# Patient Record
Sex: MALE | Race: WHITE | NOT HISPANIC OR LATINO | Employment: FULL TIME | URBAN - METROPOLITAN AREA
[De-identification: names, ages, dates, MRNs, and addresses within clinical notes are randomized per-mention and may not be internally consistent; named-entity substitution may affect disease eponyms.]

---

## 2025-06-26 ENCOUNTER — HOSPITAL ENCOUNTER (EMERGENCY)
Facility: HOSPITAL | Age: 50
Discharge: HOME | End: 2025-06-26
Payer: COMMERCIAL

## 2025-06-26 VITALS
BODY MASS INDEX: 20.31 KG/M2 | WEIGHT: 149.91 LBS | HEIGHT: 72 IN | DIASTOLIC BLOOD PRESSURE: 94 MMHG | TEMPERATURE: 97.9 F | OXYGEN SATURATION: 98 % | RESPIRATION RATE: 16 BRPM | HEART RATE: 83 BPM | SYSTOLIC BLOOD PRESSURE: 157 MMHG

## 2025-06-26 DIAGNOSIS — S51.819A: Primary | ICD-10-CM

## 2025-06-26 PROCEDURE — 90471 IMMUNIZATION ADMIN: CPT | Performed by: NURSE PRACTITIONER

## 2025-06-26 PROCEDURE — 96372 THER/PROPH/DIAG INJ SC/IM: CPT | Performed by: NURSE PRACTITIONER

## 2025-06-26 PROCEDURE — 99283 EMERGENCY DEPT VISIT LOW MDM: CPT | Mod: 25

## 2025-06-26 PROCEDURE — 12032 INTMD RPR S/A/T/EXT 2.6-7.5: CPT | Performed by: NURSE PRACTITIONER

## 2025-06-26 PROCEDURE — 29075 APPL CST ELBW FNGR SHORT ARM: CPT | Performed by: NURSE PRACTITIONER

## 2025-06-26 PROCEDURE — 90715 TDAP VACCINE 7 YRS/> IM: CPT | Performed by: NURSE PRACTITIONER

## 2025-06-26 PROCEDURE — 2500000004 HC RX 250 GENERAL PHARMACY W/ HCPCS (ALT 636 FOR OP/ED): Performed by: NURSE PRACTITIONER

## 2025-06-26 RX ORDER — LIDOCAINE HYDROCHLORIDE AND EPINEPHRINE 10; 10 UG/ML; MG/ML
2 INJECTION, SOLUTION INFILTRATION; PERINEURAL ONCE
Status: COMPLETED | OUTPATIENT
Start: 2025-06-26 | End: 2025-06-26

## 2025-06-26 RX ORDER — CEPHALEXIN 500 MG/1
500 CAPSULE ORAL 4 TIMES DAILY
Qty: 28 CAPSULE | Refills: 0 | Status: SHIPPED | OUTPATIENT
Start: 2025-06-26 | End: 2025-07-03

## 2025-06-26 RX ORDER — CEFAZOLIN 1 G/1
1 INJECTION, POWDER, FOR SOLUTION INTRAVENOUS ONCE
Status: COMPLETED | OUTPATIENT
Start: 2025-06-26 | End: 2025-06-26

## 2025-06-26 RX ADMIN — TETANUS TOXOID, REDUCED DIPHTHERIA TOXOID AND ACELLULAR PERTUSSIS VACCINE, ADSORBED 0.5 ML: 5; 2.5; 8; 8; 2.5 SUSPENSION INTRAMUSCULAR at 19:41

## 2025-06-26 RX ADMIN — CEFAZOLIN 1 G: 1 INJECTION, POWDER, FOR SOLUTION INTRAMUSCULAR; INTRAVENOUS at 19:41

## 2025-06-26 RX ADMIN — LIDOCAINE HYDROCHLORIDE,EPINEPHRINE BITARTRATE 2 ML: 10; .01 INJECTION, SOLUTION INFILTRATION; PERINEURAL at 19:45

## 2025-06-26 NOTE — ED TRIAGE NOTES
Pt states that he has a cut on his right upper forearm that he got while working. Unsure of when his last tetanus shot was.

## 2025-06-26 NOTE — ED PROVIDER NOTES
Emergency Department Provider Note        History of Present Illness     History provided by: Patient  Limitations to History: None    HPI:  Saji Mi is a 49 y.o. male with no significant past medical history reported presents emergency department today due to a laceration to his right, dominant hand, forearm.  Patient states that he was moving some sheet-metal while he was at work and a piece of the sheet-metal pressed into his arm causing a laceration.  Patient was having pain with finger movements and shifting his car after work.  He was advised by his employer to come in for evaluation.  He denies any distal numbness or weakness.  Bleeding is controlled.  He does not believe his tetanus shot is updated.    Physical Exam   Triage vitals:  T 36.6 °C (97.9 °F)  HR 83  BP (!) 157/94  RR 16  O2 98 %      Physical Exam  Vitals and nursing note reviewed.   Constitutional:       General: He is not in acute distress.     Appearance: He is well-developed.   HENT:      Head: Normocephalic and atraumatic.   Eyes:      Conjunctiva/sclera: Conjunctivae normal.   Cardiovascular:      Rate and Rhythm: Normal rate and regular rhythm.      Heart sounds: No murmur heard.  Pulmonary:      Effort: Pulmonary effort is normal. No respiratory distress.      Breath sounds: Normal breath sounds.   Abdominal:      Palpations: Abdomen is soft.      Tenderness: There is no abdominal tenderness.   Musculoskeletal:         General: No swelling.        Arms:       Cervical back: Neck supple.      Comments: LACERATION:     HAND EXAM: Exam of the right upper extremity shows a 3 centimeter laceration over the volar aspect of his forearm. It appears to be fairly superficial. There are no foreign bodies. Is neurovascularly intact distally. Specifically, has full strength with flexion and extension. Was examined  through full range of motion with no obvious tendon injury.  Patient does have pain in the forearm with flexion of digits 3  through 5.   Skin:     General: Skin is warm and dry.      Capillary Refill: Capillary refill takes less than 2 seconds.   Neurological:      Mental Status: He is alert.   Psychiatric:         Mood and Affect: Mood normal.          Medical Decision Making & ED Course   Medical Decision Makin y.o. male with no significant past medical history reported presents emergency department today due to a laceration to his right, dominant hand, forearm.  Patient states that he was moving some sheet-metal while he was at work and a piece of the sheet-metal pressed into his arm causing a laceration.  Patient was having pain with finger movements and shifting his car after work.  He was advised by his employer to come in for evaluation.  He denies any distal numbness or weakness.  Bleeding is controlled.  He does not believe his tetanus shot is updated.  Patient's examination reveals a laceration to his forearm.  It appears fairly superficial however I cannot visualize the base due to clot formation.  I did copious amounts of pressure wash and still unable to visualize past the soft tissue.  Given his pain with movement I am concerned there may be an occult tendon injury.  I did cover him with Ancef and splinted his arm after repaired his laceration.  There is no evidence of tendon injury, nerve injury or significant vascular injury. Laceration was repaired, see procedure note. wound infection precautions were discussed with patient and patient verbalized understanding these.  Timeframe to return to ED for suture removal was discussed with patient if indicated. plint was placed by nurse Jaskaran LPN, see procedure note.  RICE Therapy discussed with patient/parent.  Post splint care discussed.  Patient neurovascular status maintained after splint application.  If numbness, tingling, coolness, severe pain in the extremity, or delayed capillary refill occur, return to ED immediately for evaluation.  Given that this injury occurred  at work I did place referral to occupational health for follow-up.  Patient also referred to hand surgery for possible tendon injury.  ----  Differential diagnoses considered include but are not limited to: Finger laceration, tendon injury, nerve injury, vascular injury        Social Determinants of Health which Significantly Impact Care: None identified     EKG Independent Interpretation: EKG not obtained    Independent Result Review and Interpretation: None obtained    Chronic conditions affecting the patient's care: As documented above in [facility]    The patient was discussed with the following consultants/services: None    Care Considerations: As documented above in [facility]    ED Course:  Diagnoses as of 06/26/25 1933   Forearm laceration with complication, initial encounter     Disposition   As a result of the work-up, the patient was discharged home.  he was informed of his diagnosis and instructed to come back with any concerns or worsening of condition.  he and was agreeable to the plan as discussed above.  he was given the opportunity to ask questions.  All of the patient's questions were answered.    Procedures   Laceration Repair    Performed by: ABHISHEK Mackenzie  Authorized by: ABHISHEK Mackenzie    Consent:     Consent obtained:  Verbal    Consent given by:  Patient    Risks, benefits, and alternatives were discussed: yes      Risks discussed:  Infection, pain, retained foreign body, need for additional repair, poor cosmetic result, tendon damage, nerve damage, poor wound healing and vascular damage    Alternatives discussed:  No treatment, delayed treatment, observation and referral  Universal protocol:     Procedure explained and questions answered to patient or proxy's satisfaction: yes      Relevant documents present and verified: yes      Required blood products, implants, devices, and special equipment available: yes      Immediately prior to procedure, a time out was called: yes       Patient identity confirmed:  Verbally with patient and arm band  Anesthesia:     Anesthesia method:  Local infiltration    Local anesthetic:  Lidocaine 1% WITH epi  Laceration details:     Location:  Shoulder/arm    Shoulder/arm location:  R lower arm    Length (cm):  3  Pre-procedure details:     Preparation:  Patient was prepped and draped in usual sterile fashion  Exploration:     Hemostasis achieved with:  Direct pressure and epinephrine    Wound exploration: wound explored through full range of motion    Treatment:     Area cleansed with:  Saline    Amount of cleaning:  Extensive    Irrigation volume:  500    Irrigation method:  Pressure wash  Skin repair:     Repair method:  Sutures    Suture size:  4-0    Suture material:  Prolene    Suture technique:  Simple interrupted    Number of sutures:  3  Approximation:     Approximation:  Close  Repair type:     Repair type:  Intermediate  Post-procedure details:     Dressing:  Adhesive bandage    Procedure completion:  Tolerated well, no immediate complications  Splint Application    Performed by: ABHISHEK Mackenzie  Authorized by: ABHISHEK Mackenzie    Consent:     Consent obtained:  Verbal    Consent given by:  Patient    Risks, benefits, and alternatives were discussed: yes      Risks discussed:  Discoloration, numbness, pain and swelling    Alternatives discussed:  No treatment, delayed treatment, alternative treatment, observation and referral  Universal protocol:     Procedure explained and questions answered to patient or proxy's satisfaction: yes      Relevant documents present and verified: yes      Test results available: yes      Imaging studies available: yes      Required blood products, implants, devices, and special equipment available: yes      Site/side marked: yes      Immediately prior to procedure a time out was called: yes      Patient identity confirmed:  Verbally with patient and arm band  Pre-procedure details:     Distal  neurologic exam:  Normal    Distal perfusion: distal pulses strong and brisk capillary refill    Procedure details:     Location:  Hand    Hand location:  R hand    Cast type:  Short arm    Splint type:  Volar short arm    Supplies:  Fiberglass    Splint applied by::  TEMITOPE Lou    Supervision: I personally supervised and inspected the splint/strap which was applied. The extremity is appropriately immobilized. Patient neurovascularly intact before and after the splint application.    Post-procedure details:     Distal neurologic exam:  Normal    Distal perfusion: distal pulses strong and brisk capillary refill      Procedure completion:  Tolerated well, no immediate complications    Post-procedure imaging: not applicable        Patient was seen independently    ABHISHEK Mackenzie  Emergency Medicine     ABHISHEK Mackenzie  06/26/25 1940

## 2025-06-30 ENCOUNTER — OFFICE VISIT (OUTPATIENT)
Dept: ORTHOPEDIC SURGERY | Facility: CLINIC | Age: 50
End: 2025-06-30
Payer: COMMERCIAL

## 2025-06-30 DIAGNOSIS — S51.811A FOREARM LACERATION, RIGHT, INITIAL ENCOUNTER: Primary | ICD-10-CM

## 2025-06-30 PROCEDURE — 99203 OFFICE O/P NEW LOW 30 MIN: CPT | Performed by: ORTHOPAEDIC SURGERY

## 2025-06-30 PROCEDURE — 99212 OFFICE O/P EST SF 10 MIN: CPT | Performed by: ORTHOPAEDIC SURGERY

## 2025-06-30 NOTE — PROGRESS NOTES
Subjective    Patient ID: Saji Mi is a 49 y.o. male.    Chief Complaint: Worker's Compensation (RT FOREARM LACERATION/DOI: 6/26/25)     Last Surgery: No surgery found  Last Surgery Date: No surgery found    HPI  Patient is a 49-year-old right-hand-dominant male who sustained a laceration to his right forearm on June 26, 2025.  Intracardial work.  A large piece of sheet metal causing transverse laceration along the volar aspect of his right forearm and pretty much the mid shaft.  It was overlying the FCR tendon.  He was seen at [facility] where the wound was irrigated.  He does complain of mild paresthesias in the median nerve distribution.  However he does not notice any inability to hold objects.    Objective   Ortho Exam  The patient is in no acute distress.  Exam of his right forearm reveals he has about a 2 cm transverse laceration overlying the FCR tendon.  The FCR clinically appears intact with no palpable defect.  He has appropriate function of his FPL, left FDS and FDP of all 4 fingers.  He also has function of his FCR.  He has a negative Tinel's test near the laceration site and distally at the carpal tunnel.    Assessment/Plan   Encounter Diagnoses:  Forearm laceration, right, initial encounter    The patient sustained a laceration to his right forearm.  Clinically there is no evidence of any tendon injury.  He also appears to have no injury to his median nerve.  However the patient will return to clinic in 1 week for a wound check and removal of sutures.  At that time another nerve exam will be performed determine if there are any changes.

## 2025-07-09 ENCOUNTER — OFFICE VISIT (OUTPATIENT)
Dept: ORTHOPEDIC SURGERY | Facility: CLINIC | Age: 50
End: 2025-07-09
Payer: COMMERCIAL

## 2025-07-09 VITALS — WEIGHT: 149 LBS | HEIGHT: 72 IN | BODY MASS INDEX: 20.18 KG/M2

## 2025-07-09 DIAGNOSIS — S51.811A FOREARM LACERATION, RIGHT, INITIAL ENCOUNTER: Primary | ICD-10-CM

## 2025-07-09 PROCEDURE — 99212 OFFICE O/P EST SF 10 MIN: CPT | Performed by: ORTHOPAEDIC SURGERY

## 2025-07-09 NOTE — PROGRESS NOTES
Subjective    Patient ID: Saji Mi is a 49 y.o. male.    Chief Complaint: Worker's Compensation (F/U RT FOREARM LACERATION/DOI: 6/26/25/)     Last Surgery: No surgery found  Last Surgery Date: No surgery found    HPI  Patient returns today for follow-up of his right forearm laceration.  He states that since his last visit he has noticed improvement in strength and sensation.  He no longer has numbness or paresthesias in his middle and ring fingers.    Objective   Ortho Exam  Exam of the patient's right forearm reveals his laceration is well-healed.  Sutures were removed.  There is no evidence of infection.  He has full range of motion in his wrist and fingers.  He has sensation intact to sharp and light touch in his index, middle, ring and little fingers.    Assessment/Plan   Encounter Diagnoses:  Forearm laceration, right, initial encounter    Patient has a healed right forearm laceration.  There is no evidence of any tendon or nerve damage.  He may use his right upper extremity is much as he can tolerate.  He will follow-up as his symptoms dictate.